# Patient Record
Sex: FEMALE | Race: BLACK OR AFRICAN AMERICAN
[De-identification: names, ages, dates, MRNs, and addresses within clinical notes are randomized per-mention and may not be internally consistent; named-entity substitution may affect disease eponyms.]

---

## 2017-01-11 PROBLEM — Z00.00 ENCOUNTER FOR PREVENTIVE HEALTH EXAMINATION: Status: ACTIVE | Noted: 2017-01-11

## 2017-01-13 ENCOUNTER — APPOINTMENT (OUTPATIENT)
Dept: HEART AND VASCULAR | Facility: CLINIC | Age: 37
End: 2017-01-13

## 2017-01-13 RX ORDER — MULTIVITAMIN
TABLET ORAL
Refills: 0 | Status: ACTIVE | COMMUNITY

## 2017-03-06 ENCOUNTER — APPOINTMENT (OUTPATIENT)
Dept: PLASTIC SURGERY | Facility: CLINIC | Age: 37
End: 2017-03-06

## 2024-03-25 ENCOUNTER — APPOINTMENT (OUTPATIENT)
Dept: HEART AND VASCULAR | Facility: CLINIC | Age: 44
End: 2024-03-25
Payer: COMMERCIAL

## 2024-03-25 PROCEDURE — 99204 OFFICE O/P NEW MOD 45 MIN: CPT

## 2024-03-26 ENCOUNTER — TRANSCRIPTION ENCOUNTER (OUTPATIENT)
Age: 44
End: 2024-03-26

## 2024-03-26 NOTE — ASSESSMENT
[Venous malformation] : venous malformation [FreeTextEntry1] : Archana Neely is an 43-year-old female with a known multifocal venous malformation in the dominant right upper extremity. She's been treated both with surgical resection as well as  sclerotherapy times three. She has a significant contracture of the right elbow and is unable to extend the arm beyond 90.  Her last treatment was seven years ago and she has had no treatment or scans since then. She has had some physical therapy in an effort to improve the contracture but this has not been very effective.  She comes in today as she's been having recurrent episodes of significant pain and swelling in the forearm and thenar area for the last several months. The pain is quite severe and only slightly improved with pain medication (ibuprofen). It does not seem to be related to activity or position but she said it is worse at night. On physical exam she has a significant fusiform swelling of the distal forearm and wrist as well as enlargement of the thenar eminence and the base of the thumb. Neuromuscular function, aside from the contracture, is intact. I did an ultrasound in the office which shows multiple areas of thrombosis within the malformation which almost certainly accounts for her pain and swelling. We will send her for a new MRI and MRA and I told her to start low dose aspirin as well as warm soaks for symptomatic relief. I explained that this type of spontaneous thrombosis is not unusual in venous malformations. If there is no significant improvement after two weeks and depending on the findings on the MRA MRI we may bring her in for further sclerotherapy. She has seen Dr Olvera of plastic surgery in the past who meant recommended physical therapy at that time. I may have her also seen by Dr Riddle (hand ortho) to see if he thinks any surgical approach might help her contracture.     A total of 45 minutes was spent on this visit, including time spent face-to-face and non-face-to-face. Previous notes/ imaging/labs reviewed, obtained medically appropriate history and physical examination and documented the findings in the note. Greater than 50% of the face-to-face encounter time was spent on counseling and/or coordination of care including imaging studies (MRI, CT) and/or ultrasound exam performed at time of visit for venous malformations. All relevant risks and benefits were discussed. The patient verbalizes understanding and agreement with the plan.

## 2024-03-26 NOTE — HISTORY OF PRESENT ILLNESS
[FreeTextEntry1] : This is a 43-year-old female with a history of RUE venous malformation of the right forearm and AC area now s/p surgery and direct embolization x 3 in [id number] and history of right arm contracture presenting today for follow up, last seen in 2017. Following her embolization, she returned with pain and inability to straighten her right arm which improved for a time with PT but progressed to a full, fixed contracture of her right elbow. She was seen and assessed by Dr. Olvera in 2017 who recommended a trial of PT; he was unable to identify scar bands or bony abnormalities causing the contracture and felt the joint was simply stiff from disuse caused by pain limitations on range of motion.   She presents today for evaluation of RUE hand/wrist pain. She states she has had two similar pain episodes in the last five years. The first was in 2019, when she developed an acute pain episode effecting her wrist and hypothenar palm. The pain was worse at night and with use of her hand. Otherwise, she reports no weakness or sensory changes of the hand at her baseline. The second episode was similar and began on 3/1/24. She notes some improvement in symptoms with ibuprofen and has noticed some firmness of the area with onset of symptoms. She did work with a physical therapist according to Dr. Olvera's recommendations for the period of insurance coverage, she thinks about three months, but unfortunately she had no improvement and has lived with a fixed contracture of her elbow since.   Procedure history:  - 6/30/11 - DSE R antecubital area 3% STS - 12/5/09 - DSE R antecubital area 3% STS - 8/19/09 - DSE distal humerus / elbow 3% STS  Medical history: Otherwise healthy. Has eczema.  Surgeries: venous malformation resection in childhood of her proximal forearm Medications: Opzelura cream Allergies: None social: denies smoking, drinking, recreational drug use. Lives in [place].

## 2024-03-26 NOTE — PHYSICAL EXAM
[Alert] : alert [No Acute Distress] : no acute distress [Well Nourished] : well nourished [Well Developed] : well developed [Normal Sclera/Conjunctiva] : normal sclera/conjunctiva [EOMI] : extra occular movement intact [No Proptosis] : no proptosis [Normal Oropharynx] : the oropharynx was normal [No Respiratory Distress] : no respiratory distress [No Accessory Muscle Use] : no accessory muscle use [Clear to Auscultation] : lungs were clear to auscultation bilaterally [Normal S1, S2] : normal S1 and S2 [Normal Rate] : heart rate was normal  [Regular Rhythm] : with a regular rhythm [Pedal Pulses Normal] : the pedal pulses are present [No Edema] : there was no peripheral edema [Normal Bowel Sounds] : normal bowel sounds [Not Tender] : non-tender [Soft] : abdomen soft [Not Distended] : not distended [Normal Post Cervical Nodes] : posterior cervical nodes [Normal Anterior Cervical Nodes] : anterior cervical nodes [Normal Axillary Nodes] : axillary nodes [No Spinal Tenderness] : no spinal tenderness [Spine Straight] : spine straight [No Stigmata of Cushings Syndrome] : no stigmata of cushings syndrome [No Rash] : no rash [Normal Reflexes] : deep tendon reflexes were 2+ and symmetric [No Tremors] : no tremors [Oriented x3] : oriented to person, place, and time [de-identified] : Puffy venous malformation of the posterior wrist, anterior wrist, anterior and posterior hypothenar eminence. Posterior thumb. Normal strength and sensation of arm. On ultrasound, thrombosis of larve cavity of malformation. Radial artery noted to run along posterior aspect of wrist through malformation but does not appear to supply malformation.   [Acanthosis Nigricans___] : no acanthosis nigricans

## 2024-06-30 PROBLEM — Q27.9 VENOUS MALFORMATION: Status: ACTIVE | Noted: 2017-01-13

## 2024-06-30 PROBLEM — M24.521 CONTRACTURE OF RIGHT ELBOW: Status: ACTIVE | Noted: 2017-01-13

## 2024-06-30 PROBLEM — Z87.2 HISTORY OF ECZEMA: Status: RESOLVED | Noted: 2024-06-30 | Resolved: 2024-06-30

## 2024-06-30 PROBLEM — M79.601 PAIN OF RIGHT UPPER EXTREMITY: Status: ACTIVE | Noted: 2017-01-13

## 2024-07-01 ENCOUNTER — APPOINTMENT (OUTPATIENT)
Dept: HEART AND VASCULAR | Facility: CLINIC | Age: 44
End: 2024-07-01
Payer: COMMERCIAL

## 2024-07-01 DIAGNOSIS — M79.601 PAIN IN RIGHT ARM: ICD-10-CM

## 2024-07-01 DIAGNOSIS — Z87.2 PERSONAL HISTORY OF DISEASES OF THE SKIN AND SUBCUTANEOUS TISSUE: ICD-10-CM

## 2024-07-01 DIAGNOSIS — M24.521 CONTRACTURE, RIGHT ELBOW: ICD-10-CM

## 2024-07-01 DIAGNOSIS — Q27.9 CONGENITAL MALFORMATION OF PERIPHERAL VASCULAR SYSTEM, UNSPECIFIED: ICD-10-CM

## 2024-07-01 PROCEDURE — G2211 COMPLEX E/M VISIT ADD ON: CPT | Mod: NC

## 2024-07-01 PROCEDURE — 99214 OFFICE O/P EST MOD 30 MIN: CPT

## 2025-08-25 ENCOUNTER — APPOINTMENT (OUTPATIENT)
Dept: ORTHOPEDIC SURGERY | Facility: CLINIC | Age: 45
End: 2025-08-25
Payer: COMMERCIAL

## 2025-08-25 DIAGNOSIS — Q27.9 CONGENITAL MALFORMATION OF PERIPHERAL VASCULAR SYSTEM, UNSPECIFIED: ICD-10-CM

## 2025-08-25 DIAGNOSIS — M24.521 CONTRACTURE, RIGHT ELBOW: ICD-10-CM

## 2025-08-25 PROCEDURE — 99203 OFFICE O/P NEW LOW 30 MIN: CPT

## 2025-08-25 PROCEDURE — 73080 X-RAY EXAM OF ELBOW: CPT | Mod: RT
